# Patient Record
Sex: FEMALE | Race: BLACK OR AFRICAN AMERICAN | URBAN - METROPOLITAN AREA
[De-identification: names, ages, dates, MRNs, and addresses within clinical notes are randomized per-mention and may not be internally consistent; named-entity substitution may affect disease eponyms.]

---

## 2020-05-29 ENCOUNTER — VIRTUAL VISIT (OUTPATIENT)
Dept: INTERNAL MEDICINE | Facility: CLINIC | Age: 70
End: 2020-05-29

## 2020-05-29 DIAGNOSIS — M25.512 ACUTE PAIN OF LEFT SHOULDER: ICD-10-CM

## 2020-05-29 DIAGNOSIS — I10 BENIGN ESSENTIAL HYPERTENSION: Primary | ICD-10-CM

## 2020-05-29 PROCEDURE — 99203 OFFICE O/P NEW LOW 30 MIN: CPT | Mod: 95 | Performed by: INTERNAL MEDICINE

## 2020-05-29 RX ORDER — CHLORAL HYDRATE 500 MG
2 CAPSULE ORAL DAILY
COMMUNITY

## 2020-05-29 RX ORDER — AMLODIPINE BESYLATE 10 MG/1
10 TABLET ORAL DAILY
Qty: 90 TABLET | Refills: 0 | Status: SHIPPED | OUTPATIENT
Start: 2020-05-29 | End: 2020-08-27

## 2020-05-29 RX ORDER — AMLODIPINE BESYLATE 10 MG/1
10 TABLET ORAL DAILY
COMMUNITY
End: 2020-05-29

## 2020-05-29 SDOH — HEALTH STABILITY: MENTAL HEALTH: HOW OFTEN DO YOU HAVE A DRINK CONTAINING ALCOHOL?: NEVER

## 2020-05-29 NOTE — PROGRESS NOTES
"Court Reese is a 69 year old female who is being evaluated via a billable video visit.      The patient has been notified of following:     \"This video visit will be conducted via a call between you and your physician/provider. We have found that certain health care needs can be provided without the need for an in-person physical exam.  This service lets us provide the care you need with a video conversation.  If a prescription is necessary we can send it directly to your pharmacy.  If lab work is needed we can place an order for that and you can then stop by our lab to have the test done at a later time.    Video visits are billed at different rates depending on your insurance coverage.  Please reach out to your insurance provider with any questions.    If during the course of the call the physician/provider feels a video visit is not appropriate, you will not be charged for this service.\"    Patient has given verbal consent for Video visit? Yes    How would you like to obtain your AVS? Mail a copy    Patient would like the video invitation sent by: Text to cell phone: [phone]    Will anyone else be joining your video visit? No    Subjective     Court Reese is a 69 year old female who presents today via video visit for the following health issues:    [facility]    Video Start Time: 3:33    This patient presents for a refill of amlodipine.  She lives in [facility] and has been here visiting her daughter in [place].  She has not been able to return home yet due to the pandemic and so her medication is running out.  She has hypertension, last check with her primary doctor was January and her blood pressure was controlled at that time.  She has tolerated this medication without side effects.  She has not had any other significant health problems in the past.    Her other concern today is some left shoulder pain for 3 days.  This is at the deltoid area.  It bothers her with lifting her arm up at the side and " reaching.  She does not recall any injury.    Patient Active Problem List   Diagnosis     Benign essential hypertension         Current Outpatient Medications   Medication     amLODIPine (NORVASC) 10 MG tablet     fish oil-omega-3 fatty acids 1000 MG capsule     No current facility-administered medications for this visit.         Reviewed and updated as needed this visit by Provider         Review of Systems   No fever, chills, dyspnea on exertion, chest pain, abdominal concerns      Objective    There were no vitals taken for this visit.  There is no height or weight on file to calculate BMI.  Physical Exam     GENERAL: Healthy, alert and no distress  EYES: Eyes grossly normal to inspection.  No discharge or erythema, or obvious scleral/conjunctival abnormalities.  RESP: No audible wheeze, cough, or visible cyanosis.  No visible retractions or increased work of breathing.    SKIN: Visible skin clear. No significant rash, abnormal pigmentation or lesions.  NEURO: Cranial nerves grossly intact.  Mentation and speech appropriate for age.  PSYCH: Mentation appears normal, affect normal/bright, judgement and insight intact, normal speech and appearance well-groomed.      The left shoulder reveals good range of motion but she does have some pain with abduction        Assessment & Plan     1. Benign essential hypertension  We are unable to get a blood pressure check done.  She is hoping to be able to return home to [facility] within the next month or so.  We will go ahead and do a 90-day supply in case her plans are delayed a little further.  If for some reason she stays longer, should schedule an appointment in the clinic.  - amLODIPine (NORVASC) 10 MG tablet; Take 1 tablet (10 mg) by mouth daily  Dispense: 90 tablet; Refill: 0    2. Acute pain of left shoulder  Probably some mild tendinitis, suggest icing, over-the-counter NSAIDs, use caution with use.  Advised she could look for some exercises for bursitis online if the  symptoms persist and if significant worsening, she can call here to the clinic and we can do a referral to orthopedics.      Since she will be returning to her home country of [place] soon, no follow-up will be recommended    Video-Visit Details    Type of service:  Video Visit    Video End Time:3:41    Originating Location (pt. Location): Home    Distant Location (provider location):  home    Platform [facility] for Video Visit: [facility]    No follow-ups on file.       Ana Myers MD

## 2020-08-19 DIAGNOSIS — I10 BENIGN ESSENTIAL HYPERTENSION: ICD-10-CM

## 2020-08-20 NOTE — TELEPHONE ENCOUNTER
Pending Prescriptions:                       Disp   Refills    amLODIPine (NORVASC) 10 MG tablet [Pharmac*30 tab*2        Sig: TAKE 1 TABLET BY MOUTH EVERY DAY    Routing refill request to provider for review/approval because:  Patient fails protocol     BP Readings from Last 3 Encounters:   No data found for BP

## 2020-08-27 RX ORDER — AMLODIPINE BESYLATE 10 MG/1
TABLET ORAL
Qty: 30 TABLET | Refills: 2 | Status: SHIPPED | OUTPATIENT
Start: 2020-08-27 | End: 2020-10-23

## 2020-10-23 DIAGNOSIS — I10 BENIGN ESSENTIAL HYPERTENSION: ICD-10-CM

## 2020-10-23 RX ORDER — AMLODIPINE BESYLATE 10 MG/1
10 TABLET ORAL DAILY
Qty: 30 TABLET | Refills: 2 | Status: SHIPPED | OUTPATIENT
Start: 2020-10-23

## 2020-10-23 NOTE — TELEPHONE ENCOUNTER
Patient's daughter is calling and says her mom is out of medicine. They had called the pharmacy a couple days ago about this.

## 2020-10-23 NOTE — TELEPHONE ENCOUNTER
I did send refills, if she is going to continue to stay in this country, she should schedule an appointment with me.

## 2020-10-23 NOTE — TELEPHONE ENCOUNTER
Routing refill request to provider for review/approval because:  Labs not current:    BP elevated > [facility] protocol for RN refill